# Patient Record
Sex: FEMALE | Race: OTHER | NOT HISPANIC OR LATINO | URBAN - METROPOLITAN AREA
[De-identification: names, ages, dates, MRNs, and addresses within clinical notes are randomized per-mention and may not be internally consistent; named-entity substitution may affect disease eponyms.]

---

## 2021-03-30 ENCOUNTER — IMPORTED ENCOUNTER (OUTPATIENT)
Dept: URBAN - METROPOLITAN AREA CLINIC 1 | Facility: CLINIC | Age: 46
End: 2021-03-30

## 2021-03-30 PROBLEM — H35.3131: Noted: 2021-03-30

## 2021-03-30 PROBLEM — Z96.1: Noted: 2021-03-30

## 2021-03-30 PROBLEM — H04.123: Noted: 2021-03-30

## 2021-03-30 PROBLEM — H16.143: Noted: 2021-03-30

## 2021-03-30 PROBLEM — H44.2E3: Noted: 2021-03-30

## 2021-03-30 PROCEDURE — 99204 OFFICE O/P NEW MOD 45 MIN: CPT

## 2021-03-30 PROCEDURE — 92015 DETERMINE REFRACTIVE STATE: CPT

## 2021-03-30 NOTE — PATIENT DISCUSSION
1. Myopic Degeneration -- Condition discussed with patient. Observe. 2.  BEL w/ PEK OU -- Recommend using OTC ATs TID-QID routinely. Consider Restasis/Xiidra w/o improvment. 3.  Pseudophakia OU -- Phaco/PCL done elsewhere. H/o YAG Cap OU ([facility]). Finalized glasses MRx today. Patient is interested in CTL fit. Will reevaluate corneas in 3 months and consider CTL fit if improved. Return for an appointment in 3 months for a 10 (consider CTLs if K's improved) with Dr. Sundar Astorga.

## 2021-07-19 ENCOUNTER — IMPORTED ENCOUNTER (OUTPATIENT)
Dept: URBAN - METROPOLITAN AREA CLINIC 1 | Facility: CLINIC | Age: 46
End: 2021-07-19

## 2021-07-19 PROBLEM — H04.123: Noted: 2021-07-19

## 2021-07-19 PROBLEM — H16.143: Noted: 2021-07-19

## 2021-07-19 PROCEDURE — 99213 OFFICE O/P EST LOW 20 MIN: CPT

## 2021-07-19 NOTE — PATIENT DISCUSSION
1.  BEL w/ improved PEK OU- Recommend ATs TID OU routinely 2. Pseudophakia OU - Phaco/PCL done elsewhere. H/o YAG Cap OU ([facility]). 3.  H/o Myopic Degeneration OUReturn for an appointment in PRN CTL Fit with Dr. Quang Escudero.  (Consider Daily CTLs)

## 2021-07-21 ENCOUNTER — IMPORTED ENCOUNTER (OUTPATIENT)
Dept: URBAN - METROPOLITAN AREA CLINIC 1 | Facility: CLINIC | Age: 46
End: 2021-07-21

## 2021-07-21 NOTE — PATIENT DISCUSSION
1.  Contact Fit Today -- [place] CTL trials to be ordered by Optical ([facility]). OD Distance/OS Near. Lens OS Only. Return for an appointment in 2 weeks cc (After trials arrive) with Dr. Elvi Teixeira.

## 2022-04-02 ASSESSMENT — TONOMETRY
OD_IOP_MMHG: 19
OS_IOP_MMHG: 1
OD_IOP_MMHG: 17
OS_IOP_MMHG: 19

## 2022-04-02 ASSESSMENT — KERATOMETRY
OD_K1POWER_DIOPTERS: 41.75
OD_AXISANGLE_DEGREES: 004
OD_K2POWER_DIOPTERS: 43.25
OS_AXISANGLE_DEGREES: 177
OD_AXISANGLE2_DEGREES: 094
OD_K1POWER_DIOPTERS: 41.75
OS_AXISANGLE2_DEGREES: 087
OS_K2POWER_DIOPTERS: 44.50
OS_K2POWER_DIOPTERS: 45.00
OS_K1POWER_DIOPTERS: 42.50
OD_K2POWER_DIOPTERS: 42.75
OS_AXISANGLE2_DEGREES: 087
OD_AXISANGLE2_DEGREES: 090
OS_K1POWER_DIOPTERS: 42.50

## 2022-04-02 ASSESSMENT — VISUAL ACUITY
OD_SC: 20/25
OS_SC: 20/25-2
OS_CC: J2-2
OS_SC: 20/30
OD_SC: 20/20-2
OS_SC: 20/30
OD_SC: 20/20-1
OD_CC: J2-2

## 2022-04-07 ENCOUNTER — EMERGENCY VISIT (OUTPATIENT)
Dept: URBAN - METROPOLITAN AREA CLINIC 1 | Facility: CLINIC | Age: 47
End: 2022-04-07

## 2022-04-07 DIAGNOSIS — H43.813: ICD-10-CM

## 2022-04-07 PROCEDURE — 92015 DETERMINE REFRACTIVE STATE: CPT

## 2022-04-07 PROCEDURE — 92012 INTRM OPH EXAM EST PATIENT: CPT

## 2022-04-07 ASSESSMENT — KERATOMETRY
OS_K1POWER_DIOPTERS: 42.50
OD_AXISANGLE2_DEGREES: 094
OD_K2POWER_DIOPTERS: 43.25
OS_AXISANGLE_DEGREES: 177
OD_K1POWER_DIOPTERS: 41.75
OS_AXISANGLE2_DEGREES: 087
OS_K2POWER_DIOPTERS: 44.50
OD_AXISANGLE_DEGREES: 004

## 2022-04-07 ASSESSMENT — TONOMETRY
OD_IOP_MMHG: 18
OS_IOP_MMHG: 19

## 2022-04-07 ASSESSMENT — VISUAL ACUITY
OD_SC: 20/25
OS_SC: 20/40

## 2022-10-10 ENCOUNTER — FOLLOW UP (OUTPATIENT)
Dept: URBAN - METROPOLITAN AREA CLINIC 1 | Facility: CLINIC | Age: 47
End: 2022-10-10

## 2022-10-10 DIAGNOSIS — H16.143: ICD-10-CM

## 2022-10-10 DIAGNOSIS — Z96.1: ICD-10-CM

## 2022-10-10 DIAGNOSIS — H04.123: ICD-10-CM

## 2022-10-10 DIAGNOSIS — H43.813: ICD-10-CM

## 2022-10-10 PROCEDURE — 92015 DETERMINE REFRACTIVE STATE: CPT

## 2022-10-10 PROCEDURE — 92012 INTRM OPH EXAM EST PATIENT: CPT

## 2022-10-10 ASSESSMENT — TONOMETRY
OS_IOP_MMHG: 18
OD_IOP_MMHG: 18

## 2022-10-10 ASSESSMENT — KERATOMETRY
OS_K2POWER_DIOPTERS: 44.50
OS_AXISANGLE2_DEGREES: 087
OS_AXISANGLE_DEGREES: 177
OD_K1POWER_DIOPTERS: 41.75
OD_K2POWER_DIOPTERS: 43.25
OD_AXISANGLE_DEGREES: 004
OS_K1POWER_DIOPTERS: 42.50
OD_AXISANGLE2_DEGREES: 094

## 2022-10-10 ASSESSMENT — VISUAL ACUITY
OD_CC: 20/30
OD_SC: 20/50
OS_CC: 20/40
OS_SC: 20/50

## 2023-01-06 ENCOUNTER — EMERGENCY VISIT (OUTPATIENT)
Dept: URBAN - METROPOLITAN AREA CLINIC 1 | Facility: CLINIC | Age: 48
End: 2023-01-06

## 2023-01-06 DIAGNOSIS — H44.2C1: ICD-10-CM

## 2023-01-06 PROCEDURE — 92012 INTRM OPH EXAM EST PATIENT: CPT

## 2023-01-06 ASSESSMENT — VISUAL ACUITY
OS_SC: 20/30
OD_SC: 20/80

## 2023-01-06 ASSESSMENT — KERATOMETRY
OS_AXISANGLE_DEGREES: 177
OD_AXISANGLE_DEGREES: 004
OS_K2POWER_DIOPTERS: 44.50
OS_K1POWER_DIOPTERS: 42.50
OS_AXISANGLE2_DEGREES: 087
OD_AXISANGLE2_DEGREES: 094
OD_K2POWER_DIOPTERS: 43.25
OD_K1POWER_DIOPTERS: 41.75

## 2023-01-06 ASSESSMENT — TONOMETRY
OS_IOP_MMHG: 15
OD_IOP_MMHG: 15

## 2023-01-31 RX ORDER — IBUPROFEN 600 MG/1
600 TABLET ORAL EVERY 6 HOURS PRN
COMMUNITY
Start: 2021-12-24